# Patient Record
Sex: FEMALE | Race: WHITE | NOT HISPANIC OR LATINO | Employment: FULL TIME | URBAN - METROPOLITAN AREA
[De-identification: names, ages, dates, MRNs, and addresses within clinical notes are randomized per-mention and may not be internally consistent; named-entity substitution may affect disease eponyms.]

---

## 2023-07-04 ENCOUNTER — PATIENT MESSAGE (OUTPATIENT)
Dept: PRIMARY CARE | Facility: CLINIC | Age: 28
End: 2023-07-04
Payer: COMMERCIAL

## 2023-07-04 DIAGNOSIS — Z30.41 ENCOUNTER FOR SURVEILLANCE OF CONTRACEPTIVE PILLS: Primary | ICD-10-CM

## 2023-07-05 RX ORDER — NORGESTIMATE AND ETHINYL ESTRADIOL 7DAYSX3 LO
1 KIT ORAL DAILY
Qty: 84 TABLET | Refills: 0 | Status: SHIPPED | OUTPATIENT
Start: 2023-07-05 | End: 2023-08-03 | Stop reason: SDUPTHER

## 2023-07-05 RX ORDER — NORGESTIMATE AND ETHINYL ESTRADIOL 7DAYSX3 LO
1 KIT ORAL DAILY
COMMUNITY
Start: 2020-03-20 | End: 2023-07-05 | Stop reason: SDUPTHER

## 2023-07-13 PROBLEM — H90.72 MIXED HEARING LOSS OF LEFT EAR: Status: ACTIVE | Noted: 2023-07-13

## 2023-07-13 PROBLEM — K92.1 STOOL BLOODY: Status: ACTIVE | Noted: 2023-07-13

## 2023-07-13 PROBLEM — F43.22 ACUTE ADJUSTMENT DISORDER WITH ANXIETY: Status: ACTIVE | Noted: 2023-07-13

## 2023-07-13 PROBLEM — K59.00 CONSTIPATION: Status: ACTIVE | Noted: 2023-07-13

## 2023-07-13 PROBLEM — R53.83 FATIGUE: Status: ACTIVE | Noted: 2023-07-13

## 2023-07-13 PROBLEM — H69.93 DYSFUNCTION OF BOTH EUSTACHIAN TUBES: Status: ACTIVE | Noted: 2023-07-13

## 2023-07-13 PROBLEM — S99.921A INJURY OF RIGHT FOOT: Status: ACTIVE | Noted: 2023-07-13

## 2023-07-13 PROBLEM — D64.9 ANEMIA: Status: ACTIVE | Noted: 2023-07-13

## 2023-07-13 PROBLEM — E66.3 OVERWEIGHT (BMI 25.0-29.9): Status: ACTIVE | Noted: 2023-07-13

## 2023-07-13 PROBLEM — M94.0 COSTOCHONDRITIS: Status: ACTIVE | Noted: 2023-07-13

## 2023-07-13 PROBLEM — H90.71 MIXED HEARING LOSS OF RIGHT EAR: Status: ACTIVE | Noted: 2023-07-13

## 2023-07-13 RX ORDER — EPINEPHRINE 0.3 MG/.3ML
0.3 INJECTION SUBCUTANEOUS
COMMUNITY
Start: 2016-10-13 | End: 2023-08-09 | Stop reason: SDUPTHER

## 2023-07-13 RX ORDER — CETIRIZINE HYDROCHLORIDE 10 MG/1
1 TABLET ORAL DAILY
COMMUNITY
Start: 2022-08-03

## 2023-07-13 RX ORDER — CIPROFLOXACIN HYDROCHLORIDE 3 MG/ML
SOLUTION/ DROPS OPHTHALMIC
COMMUNITY
Start: 2021-04-02 | End: 2023-08-09

## 2023-08-03 ENCOUNTER — TELEPHONE (OUTPATIENT)
Dept: PRIMARY CARE | Facility: CLINIC | Age: 28
End: 2023-08-03
Payer: COMMERCIAL

## 2023-08-03 DIAGNOSIS — Z30.41 ENCOUNTER FOR SURVEILLANCE OF CONTRACEPTIVE PILLS: ICD-10-CM

## 2023-08-03 NOTE — TELEPHONE ENCOUNTER
Pt is requesting a refill on    Ortho Tri Cyclen  0.18/0.215/.025mg 1 po every day  LR 7/5/23 # 84    [facility]  [phone]  [place] 8/9/23  Will run out on Sunday

## 2023-08-04 RX ORDER — NORGESTIMATE AND ETHINYL ESTRADIOL 7DAYSX3 LO
1 KIT ORAL DAILY
Qty: 84 TABLET | Refills: 0 | Status: SHIPPED | OUTPATIENT
Start: 2023-08-04 | End: 2023-08-09 | Stop reason: SDUPTHER

## 2023-08-08 ASSESSMENT — PROMIS GLOBAL HEALTH SCALE
RATE_GENERAL_HEALTH: GOOD
CARRYOUT_PHYSICAL_ACTIVITIES: COMPLETELY
RATE_AVERAGE_FATIGUE: SEVERE
RATE_PHYSICAL_HEALTH: GOOD
CARRYOUT_SOCIAL_ACTIVITIES: GOOD
EMOTIONAL_PROBLEMS: SOMETIMES
RATE_SOCIAL_SATISFACTION: GOOD
RATE_QUALITY_OF_LIFE: GOOD
RATE_MENTAL_HEALTH: GOOD
RATE_AVERAGE_PAIN: 0

## 2023-08-09 ENCOUNTER — OFFICE VISIT (OUTPATIENT)
Dept: PRIMARY CARE | Facility: CLINIC | Age: 28
End: 2023-08-09
Payer: COMMERCIAL

## 2023-08-09 VITALS
BODY MASS INDEX: 27.23 KG/M2 | WEIGHT: 148 LBS | SYSTOLIC BLOOD PRESSURE: 118 MMHG | DIASTOLIC BLOOD PRESSURE: 70 MMHG | HEIGHT: 62 IN

## 2023-08-09 DIAGNOSIS — Z30.41 ENCOUNTER FOR SURVEILLANCE OF CONTRACEPTIVE PILLS: ICD-10-CM

## 2023-08-09 DIAGNOSIS — Z00.00 WELLNESS EXAMINATION: Primary | ICD-10-CM

## 2023-08-09 DIAGNOSIS — J30.89 ALLERGIC RHINITIS DUE TO OTHER ALLERGIC TRIGGER, UNSPECIFIED SEASONALITY: ICD-10-CM

## 2023-08-09 DIAGNOSIS — R79.89 ELEVATED LFTS: ICD-10-CM

## 2023-08-09 DIAGNOSIS — Z91.018 TREE NUT ALLERGY: ICD-10-CM

## 2023-08-09 LAB
ALANINE AMINOTRANSFERASE (SGPT) (U/L) IN SER/PLAS: 23 U/L (ref 7–45)
ALBUMIN (G/DL) IN SER/PLAS: 4.4 G/DL (ref 3.4–5)
ALKALINE PHOSPHATASE (U/L) IN SER/PLAS: 62 U/L (ref 33–110)
ANION GAP IN SER/PLAS: 14 MMOL/L (ref 10–20)
ASPARTATE AMINOTRANSFERASE (SGOT) (U/L) IN SER/PLAS: 43 U/L (ref 9–39)
BILIRUBIN TOTAL (MG/DL) IN SER/PLAS: 0.5 MG/DL (ref 0–1.2)
CALCIUM (MG/DL) IN SER/PLAS: 9.4 MG/DL (ref 8.6–10.3)
CARBON DIOXIDE, TOTAL (MMOL/L) IN SER/PLAS: 27 MMOL/L (ref 21–32)
CHLORIDE (MMOL/L) IN SER/PLAS: 105 MMOL/L (ref 98–107)
CREATININE (MG/DL) IN SER/PLAS: 0.94 MG/DL (ref 0.5–1.05)
ERYTHROCYTE DISTRIBUTION WIDTH (RATIO) BY AUTOMATED COUNT: 15.8 % (ref 11.5–14.5)
ERYTHROCYTE MEAN CORPUSCULAR HEMOGLOBIN CONCENTRATION (G/DL) BY AUTOMATED: 29.9 G/DL (ref 32–36)
ERYTHROCYTE MEAN CORPUSCULAR VOLUME (FL) BY AUTOMATED COUNT: 87 FL (ref 80–100)
ERYTHROCYTES (10*6/UL) IN BLOOD BY AUTOMATED COUNT: 4.82 X10E12/L (ref 4–5.2)
GFR FEMALE: 85 ML/MIN/1.73M2
GLUCOSE (MG/DL) IN SER/PLAS: 80 MG/DL (ref 74–99)
HEMATOCRIT (%) IN BLOOD BY AUTOMATED COUNT: 41.8 % (ref 36–46)
HEMOGLOBIN (G/DL) IN BLOOD: 12.5 G/DL (ref 12–16)
LEUKOCYTES (10*3/UL) IN BLOOD BY AUTOMATED COUNT: 6.4 X10E9/L (ref 4.4–11.3)
PLATELETS (10*3/UL) IN BLOOD AUTOMATED COUNT: 306 X10E9/L (ref 150–450)
POTASSIUM (MMOL/L) IN SER/PLAS: 4.6 MMOL/L (ref 3.5–5.3)
PROTEIN TOTAL: 7.2 G/DL (ref 6.4–8.2)
SODIUM (MMOL/L) IN SER/PLAS: 141 MMOL/L (ref 136–145)
THYROTROPIN (MIU/L) IN SER/PLAS BY DETECTION LIMIT <= 0.05 MIU/L: 2.66 MIU/L (ref 0.44–3.98)
UREA NITROGEN (MG/DL) IN SER/PLAS: 12 MG/DL (ref 6–23)

## 2023-08-09 PROCEDURE — 99395 PREV VISIT EST AGE 18-39: CPT | Performed by: FAMILY MEDICINE

## 2023-08-09 PROCEDURE — 80053 COMPREHEN METABOLIC PANEL: CPT

## 2023-08-09 PROCEDURE — 85027 COMPLETE CBC AUTOMATED: CPT

## 2023-08-09 PROCEDURE — 84443 ASSAY THYROID STIM HORMONE: CPT

## 2023-08-09 RX ORDER — MONTELUKAST SODIUM 10 MG/1
10 TABLET ORAL
COMMUNITY
Start: 2023-07-05 | End: 2023-08-09 | Stop reason: SDUPTHER

## 2023-08-09 RX ORDER — EPINEPHRINE 0.3 MG/.3ML
0.3 INJECTION SUBCUTANEOUS AS NEEDED
Qty: 0.3 ML | Refills: 0 | Status: SHIPPED | OUTPATIENT
Start: 2023-08-09

## 2023-08-09 RX ORDER — MONTELUKAST SODIUM 10 MG/1
10 TABLET ORAL
Qty: 90 TABLET | Refills: 3 | Status: SHIPPED | OUTPATIENT
Start: 2023-08-09

## 2023-08-09 RX ORDER — NORGESTIMATE AND ETHINYL ESTRADIOL 7DAYSX3 LO
1 KIT ORAL DAILY
Qty: 84 TABLET | Refills: 0 | Status: SHIPPED | OUTPATIENT
Start: 2023-08-09 | End: 2023-08-09 | Stop reason: SDUPTHER

## 2023-08-09 RX ORDER — NORGESTIMATE AND ETHINYL ESTRADIOL 7DAYSX3 LO
1 KIT ORAL DAILY
Qty: 84 TABLET | Refills: 3 | Status: SHIPPED | OUTPATIENT
Start: 2023-08-09

## 2023-08-09 ASSESSMENT — PATIENT HEALTH QUESTIONNAIRE - PHQ9
SUM OF ALL RESPONSES TO PHQ9 QUESTIONS 1 AND 2: 0
2. FEELING DOWN, DEPRESSED OR HOPELESS: NOT AT ALL
1. LITTLE INTEREST OR PLEASURE IN DOING THINGS: NOT AT ALL

## 2023-08-09 NOTE — PROGRESS NOTES
"Chief complaint:   Chief Complaint   Patient presents with    Annual Exam     CPE       HPI:  Ricardo Gallo is a 27 y.o. female who presents for a physical    Exercise: strength training, trying to get back into running (ran 1/2 marathon 5/2023).     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 [employer]     ROS:  Constitutional:  Denies fevers, chills, night sweats  HEENT: Denies change in vision, change in hearing, sore throat, rhinorrhea, congestion  Cardiovascular: Denies chest pain, SOB, racing heart, slow heart rate, palpitations, leg edema  Pulmonary: Denies cough, wheezing, SOB  Gastrointestinal: Denies abdominal pain, diarrhea, constipation, nausea, vomiting, heartburn  Genitourinary: Denies dysuria, hematuria, incontinence, abnormal vaginal bleeding, abnormal vaginal discharge, sexual dysfunction  Integumentary: Denies rash, new or changed skin lesions  Neuro: Admits to headaches occasionally when has not been hydrated Denies headache, numbness, tingling  Musculoskeletal: Denies myalgias, arthralgias, back pain  Psych: Admits to increased fatigue denies change in mood, sleeping difficulties  Heme: denies bruising or bleeding     Physical exam:  /70   Ht 1.575 m (5' 2\")   Wt 67.1 kg (148 lb)   BMI 27.07 kg/m²   General: NAD, well appearing female  Head: normocephalic  Ears: EAC patent, TM normal bilaterally  Eyes: EOM intact, PERRLA  Nose: moist  Mouth: moist, good dentition  Heart: RRR, no murmur appreciated  Lungs: CTAB, no wheezes, rales, rhonchi  Abdomen: soft, non tender, no organomegaly  Psych: mood and affect congruent, alert and oriented  MSK: +5/5 gross strength  Neuro: +2/4 patellar and biceps reflexes, sensation grossly intact    Assessment/Plan   Problem List Items Addressed This Visit    None  Visit Diagnoses       Wellness examination    -  " Primary    Relevant Orders    CBC (Completed)    Comprehensive Metabolic Panel (Completed)    TSH with reflex to Free T4 if abnormal (Completed)    Encounter for surveillance of contraceptive pills        Relevant Medications    norgestimate-ethinyl estradioL (Ortho Tri-Cyclen LO) 0.18/0.215/0.25 mg-25 mcg tablet    Allergic rhinitis due to other allergic trigger, unspecified seasonality        Relevant Medications    montelukast (Singulair) 10 mg tablet    Tree nut allergy        Relevant Medications    EPINEPHrine 0.3 mg/0.3 mL injection syringe    Elevated LFTs        Relevant Orders    Hepatic function panel        Last PAP 2021    Chetna Salguero, DO

## 2024-07-24 DIAGNOSIS — J30.89 ALLERGIC RHINITIS DUE TO OTHER ALLERGIC TRIGGER, UNSPECIFIED SEASONALITY: ICD-10-CM

## 2024-07-24 RX ORDER — MONTELUKAST SODIUM 10 MG/1
10 TABLET ORAL DAILY
Qty: 90 TABLET | Refills: 3 | OUTPATIENT
Start: 2024-07-24

## 2024-07-31 ENCOUNTER — APPOINTMENT (OUTPATIENT)
Dept: PRIMARY CARE | Facility: CLINIC | Age: 29
End: 2024-07-31
Payer: COMMERCIAL

## 2024-08-13 ENCOUNTER — APPOINTMENT (OUTPATIENT)
Dept: PRIMARY CARE | Facility: CLINIC | Age: 29
End: 2024-08-13
Payer: COMMERCIAL

## 2024-08-13 VITALS
WEIGHT: 150 LBS | SYSTOLIC BLOOD PRESSURE: 122 MMHG | HEIGHT: 62 IN | DIASTOLIC BLOOD PRESSURE: 70 MMHG | BODY MASS INDEX: 27.6 KG/M2

## 2024-08-13 DIAGNOSIS — Z30.41 ENCOUNTER FOR SURVEILLANCE OF CONTRACEPTIVE PILLS: ICD-10-CM

## 2024-08-13 DIAGNOSIS — J30.89 ALLERGIC RHINITIS DUE TO OTHER ALLERGIC TRIGGER, UNSPECIFIED SEASONALITY: ICD-10-CM

## 2024-08-13 DIAGNOSIS — Z00.00 WELLNESS EXAMINATION: Primary | ICD-10-CM

## 2024-08-13 DIAGNOSIS — Z12.4 CERVICAL CANCER SCREENING: ICD-10-CM

## 2024-08-13 DIAGNOSIS — Z91.018 TREE NUT ALLERGY: ICD-10-CM

## 2024-08-13 LAB
BASOPHILS # BLD AUTO: 0.04 X10*3/UL (ref 0–0.1)
BASOPHILS NFR BLD AUTO: 0.4 %
EOSINOPHIL # BLD AUTO: 0.06 X10*3/UL (ref 0–0.7)
EOSINOPHIL NFR BLD AUTO: 0.6 %
ERYTHROCYTE [DISTWIDTH] IN BLOOD BY AUTOMATED COUNT: 13.2 % (ref 11.5–14.5)
HCT VFR BLD AUTO: 44.3 % (ref 36–46)
HGB BLD-MCNC: 14 G/DL (ref 12–16)
IMM GRANULOCYTES # BLD AUTO: 0.02 X10*3/UL (ref 0–0.7)
IMM GRANULOCYTES NFR BLD AUTO: 0.2 % (ref 0–0.9)
LYMPHOCYTES # BLD AUTO: 2.76 X10*3/UL (ref 1.2–4.8)
LYMPHOCYTES NFR BLD AUTO: 29.8 %
MCH RBC QN AUTO: 28.4 PG (ref 26–34)
MCHC RBC AUTO-ENTMCNC: 31.6 G/DL (ref 32–36)
MCV RBC AUTO: 90 FL (ref 80–100)
MONOCYTES # BLD AUTO: 0.59 X10*3/UL (ref 0.1–1)
MONOCYTES NFR BLD AUTO: 6.4 %
NEUTROPHILS # BLD AUTO: 5.78 X10*3/UL (ref 1.2–7.7)
NEUTROPHILS NFR BLD AUTO: 62.6 %
NRBC BLD-RTO: 0 /100 WBCS (ref 0–0)
PLATELET # BLD AUTO: 303 X10*3/UL (ref 150–450)
RBC # BLD AUTO: 4.93 X10*6/UL (ref 4–5.2)
WBC # BLD AUTO: 9.3 X10*3/UL (ref 4.4–11.3)

## 2024-08-13 PROCEDURE — 85025 COMPLETE CBC W/AUTO DIFF WBC: CPT

## 2024-08-13 PROCEDURE — 84443 ASSAY THYROID STIM HORMONE: CPT

## 2024-08-13 PROCEDURE — 3008F BODY MASS INDEX DOCD: CPT | Performed by: FAMILY MEDICINE

## 2024-08-13 PROCEDURE — 80053 COMPREHEN METABOLIC PANEL: CPT

## 2024-08-13 PROCEDURE — 99395 PREV VISIT EST AGE 18-39: CPT | Performed by: FAMILY MEDICINE

## 2024-08-13 RX ORDER — NORGESTIMATE AND ETHINYL ESTRADIOL 7DAYSX3 LO
1 KIT ORAL DAILY
Qty: 84 TABLET | Refills: 3 | Status: SHIPPED | OUTPATIENT
Start: 2024-08-13

## 2024-08-13 RX ORDER — MONTELUKAST SODIUM 10 MG/1
10 TABLET ORAL
Qty: 90 TABLET | Refills: 3 | Status: SHIPPED | OUTPATIENT
Start: 2024-08-13

## 2024-08-13 RX ORDER — EPINEPHRINE 0.3 MG/.3ML
0.3 INJECTION SUBCUTANEOUS AS NEEDED
Qty: 0.3 ML | Refills: 0 | Status: SHIPPED | OUTPATIENT
Start: 2024-08-13

## 2024-08-13 ASSESSMENT — PATIENT HEALTH QUESTIONNAIRE - PHQ9
1. LITTLE INTEREST OR PLEASURE IN DOING THINGS: NOT AT ALL
2. FEELING DOWN, DEPRESSED OR HOPELESS: NOT AT ALL
SUM OF ALL RESPONSES TO PHQ9 QUESTIONS 1 AND 2: 0

## 2024-08-13 NOTE — PROGRESS NOTES
"Chief complaint:   Chief Complaint   Patient presents with    Annual Exam     CPE    Med Refill     Montelukast        HPI:  Ricardo Gallo is a 28 y.o. female who presents for a physical    Exercise: training for a half marathon  Alcohol: 1 drink or less weekly  Drug: none  Tobacco: no hx    ROS:  Constitutional:  Admits to night sweats Denies fevers, chills  HEENT: Denies change in vision, change in hearing, sore throat, rhinorrhea, congestion  Cardiovascular: Denies chest pain, SOB, racing heart, slow heart rate, palpitations, leg edema  Pulmonary: Denies cough, wheezing, SOB  Gastrointestinal: Denies abdominal pain, diarrhea, constipation, nausea, vomiting, heartburn  Genitourinary: Denies dysuria, hematuria, incontinence, abnormal vaginal bleeding, abnormal vaginal discharge, sexual dysfunction  Integumentary: Denies rash, new or changed skin lesions  Neuro: Denies headache, numbness, tingling  Musculoskeletal: Denies myalgias, arthralgias, back pain  Psych: denies change in mood, sleeping difficulties  Heme: denies bruising or bleeding     Physical exam:  /70   Ht 1.575 m (5' 2\")   Wt 68 kg (150 lb)   BMI 27.44 kg/m²   General: NAD, well appearing female  Head: normocephalic  Ears: EAC patent, TM normal bilaterally  Eyes: EOM intact, PERRLA  Nose: moist  Mouth: moist, good dentition  Heart: RRR, no murmur appreciated  Lungs: CTAB, no wheezes, rales, rhonchi  Abdomen: soft, non tender, no organomegaly  Psych: mood and affect congruent, alert and oriented  MSK: +5/5 gross strength  Neuro: +2/4 patellar and biceps reflexes, sensation grossly intact  Skin: warm and dry    Assessment/Plan   Problem List Items Addressed This Visit    None  Visit Diagnoses       Wellness examination    -  Primary    Relevant Orders    CBC and Auto Differential    Tsh With Reflex To Free T4 If Abnormal    Comprehensive metabolic panel    Allergic rhinitis due to other allergic trigger, unspecified seasonality        Relevant " Medications    montelukast (Singulair) 10 mg tablet    Encounter for surveillance of contraceptive pills        Relevant Medications    norgestimate-ethinyl estradioL (Ortho Tri-Cyclen LO) 0.18/0.215/0.25 mg-25 mcg tablet    Tree nut allergy        Relevant Medications    EPINEPHrine 0.3 mg/0.3 mL injection syringe    Cervical cancer screening        Relevant Orders    THINPREP PAP TEST        Discussed family planning and recommend starting prenatal vitamins at least 1 mo prior to trying to conceive.    Chetna Salguero, DO

## 2024-08-14 LAB
ALBUMIN SERPL BCP-MCNC: 4.3 G/DL (ref 3.4–5)
ALP SERPL-CCNC: 61 U/L (ref 33–110)
ALT SERPL W P-5'-P-CCNC: 14 U/L (ref 7–45)
ANION GAP SERPL CALC-SCNC: 13 MMOL/L (ref 10–20)
AST SERPL W P-5'-P-CCNC: 15 U/L (ref 9–39)
BILIRUB SERPL-MCNC: 0.4 MG/DL (ref 0–1.2)
BUN SERPL-MCNC: 16 MG/DL (ref 6–23)
CALCIUM SERPL-MCNC: 9.8 MG/DL (ref 8.6–10.3)
CHLORIDE SERPL-SCNC: 103 MMOL/L (ref 98–107)
CO2 SERPL-SCNC: 27 MMOL/L (ref 21–32)
CREAT SERPL-MCNC: 0.86 MG/DL (ref 0.5–1.05)
EGFRCR SERPLBLD CKD-EPI 2021: >90 ML/MIN/1.73M*2
GLUCOSE SERPL-MCNC: 82 MG/DL (ref 74–99)
POTASSIUM SERPL-SCNC: 4.3 MMOL/L (ref 3.5–5.3)
PROT SERPL-MCNC: 7.4 G/DL (ref 6.4–8.2)
SODIUM SERPL-SCNC: 139 MMOL/L (ref 136–145)
TSH SERPL-ACNC: 3.49 MIU/L (ref 0.44–3.98)

## 2024-08-26 LAB
CYTOLOGY CMNT CVX/VAG CYTO-IMP: NORMAL
LAB AP HPV GENOTYPE QUESTION: YES
LAB AP HPV HR: NORMAL
LABORATORY COMMENT REPORT: NORMAL
PATH REPORT.TOTAL CANCER: NORMAL

## 2025-08-12 ENCOUNTER — APPOINTMENT (OUTPATIENT)
Dept: PRIMARY CARE | Facility: CLINIC | Age: 30
End: 2025-08-12
Payer: COMMERCIAL

## 2025-12-23 ENCOUNTER — APPOINTMENT (OUTPATIENT)
Dept: PRIMARY CARE | Facility: CLINIC | Age: 30
End: 2025-12-23
Payer: COMMERCIAL